# Patient Record
Sex: MALE | Race: WHITE | NOT HISPANIC OR LATINO | Employment: OTHER | URBAN - METROPOLITAN AREA
[De-identification: names, ages, dates, MRNs, and addresses within clinical notes are randomized per-mention and may not be internally consistent; named-entity substitution may affect disease eponyms.]

---

## 2022-08-18 ENCOUNTER — TRANSITIONAL CARE UNIT VISIT (OUTPATIENT)
Dept: GERIATRICS | Facility: CLINIC | Age: 82
End: 2022-08-18
Payer: COMMERCIAL

## 2022-08-18 VITALS
OXYGEN SATURATION: 97 % | DIASTOLIC BLOOD PRESSURE: 71 MMHG | SYSTOLIC BLOOD PRESSURE: 100 MMHG | HEART RATE: 99 BPM | TEMPERATURE: 97.7 F | RESPIRATION RATE: 16 BRPM

## 2022-08-18 DIAGNOSIS — I50.23 ACUTE ON CHRONIC SYSTOLIC HEART FAILURE (H): ICD-10-CM

## 2022-08-18 DIAGNOSIS — I48.91 ATRIAL FIBRILLATION, UNSPECIFIED TYPE (H): ICD-10-CM

## 2022-08-18 DIAGNOSIS — Z95.5 STATUS POST CORONARY ARTERY STENT PLACEMENT: ICD-10-CM

## 2022-08-18 DIAGNOSIS — N18.30 STAGE 3 CHRONIC KIDNEY DISEASE, UNSPECIFIED WHETHER STAGE 3A OR 3B CKD (H): ICD-10-CM

## 2022-08-18 DIAGNOSIS — F41.1 GAD (GENERALIZED ANXIETY DISORDER): ICD-10-CM

## 2022-08-18 DIAGNOSIS — I21.02: Primary | ICD-10-CM

## 2022-08-18 PROCEDURE — 99310 SBSQ NF CARE HIGH MDM 45: CPT | Performed by: NURSE PRACTITIONER

## 2022-08-18 RX ORDER — ASPIRIN 81 MG/1
81 TABLET, CHEWABLE ORAL DAILY
COMMUNITY

## 2022-08-18 RX ORDER — ACETAMINOPHEN 325 MG/1
650 TABLET ORAL EVERY 4 HOURS PRN
COMMUNITY

## 2022-08-18 RX ORDER — TRAZODONE HYDROCHLORIDE 50 MG/1
50 TABLET, FILM COATED ORAL AT BEDTIME
COMMUNITY

## 2022-08-18 RX ORDER — METOPROLOL SUCCINATE 25 MG/1
12.5 TABLET, EXTENDED RELEASE ORAL DAILY
COMMUNITY

## 2022-08-18 RX ORDER — ROSUVASTATIN CALCIUM 20 MG/1
20 TABLET, COATED ORAL DAILY
COMMUNITY

## 2022-08-18 RX ORDER — QUETIAPINE FUMARATE 25 MG/1
25 TABLET, FILM COATED ORAL AT BEDTIME
COMMUNITY

## 2022-08-18 NOTE — LETTER
8/18/2022        RE: Dre Franklin  [address] [facility] [id number]        [facility]    Code Status:  FULL CODE   Visit Type:   Chief Complaint   Patient presents with     Hospital F/U     TCU Admission     Facility:  [facility] ([facility]) [[id number]]           Transitional Care Course: Dre Franklin is a 82 year old male who I am seeing today for admit to the TCU.  Patient recently hospitalized on 8/13/2022 at [facility].  Patient admitted with ST elevation myocardial infarction.  Past medical history includes coronary artery disease status post stenting to the right coronary posterior lateral artery in 2012, COLIN, HFrEF, ischemic cardiomyopathy, hyperlipidemia, obesity and tobacco abuse.  Patient admitted with acute chest pain.  He was found to have an anteroseptal STEMI and underwent emergent coronary angiogram which showed an occluded LAD which was stented.  Patient was noted to have a patent right AGAPITO stent from prior PCI in 2012 with mild to moderate disease.  TTE on 8/12 showed an ejection fracture 27% with regional wall motion abnormalities and mild aortic/pulm pulmonic/tricuspid regurgitation and moderate mitral valve regurgitation.  Patient previously on metoprolol however this was reduced during hospitalization to 12.5 mg daily secondary to soft BP.  He continues on aspirin and statin.  Also on Brilinta for 1 year.  He did developed ICU delirium post operatively.  Suspect underlying dementia.  He does have history of COLIN which is chronic and insomnia which is chronic.  He initially was given IV Haldol and then started on Seroquel on 8/12.  Prior to admit patient was on trazodone at at bedtime, lorazepam as needed and sertraline daily.  Patient also with new diagnosis of atrial fib.  Continue on metoprolol.  Patient also started on Xarelto.  Chronic kidney disease stage III.  Creatinine baseline 1.2.  IV fluids limited secondary to  cardiomyopathy.    On today's visit patient sitting up in bedside chair.  2 days prior patient did get out of bed and went down the stairwell independently.  He has been somewhat impulsive.  Cognitive impairment noted on exam.  He is a very poor historian.  He denies any chest pain or palpitations.  He reports he is eating well.  He is having regular bowel movements and emptying his bladder.  BP satisfactory.  He continues on Seroquel.  He is on trazodone at at bedtime for insomnia however I do not see lorazepam on his orders.  He appears to have no lower extremity edema.  Blood pressure on the very soft side.  Currently asymptomatic.    Active Ambulatory Problems     Diagnosis Date Noted     No Active Ambulatory Problems     Resolved Ambulatory Problems     Diagnosis Date Noted     No Resolved Ambulatory Problems     No Additional Past Medical History     No Known Allergies    All Meds and Allergies reviewed in the record at the facility and is the most up-to-date.    Post Discharge Medication Reconciliation Status: discharge medications reconciled, continue medications without change  Current Outpatient Medications   Medication Sig     acetaminophen (TYLENOL) 325 MG tablet Take 650 mg by mouth every 4 hours as needed for mild pain     aspirin (ASA) 81 MG chewable tablet Take 81 mg by mouth daily     metoprolol succinate ER (TOPROL XL) 25 MG 24 hr tablet Take 12.5 mg by mouth daily     QUEtiapine (SEROQUEL) 25 MG tablet Take 25 mg by mouth At Bedtime     rivaroxaban ANTICOAGULANT (XARELTO) 15 MG TABS tablet Take 15 mg by mouth daily (with dinner)     rosuvastatin (CRESTOR) 20 MG tablet Take 20 mg by mouth daily     sertraline (ZOLOFT) 50 MG tablet Take 50 mg by mouth daily     ticagrelor (BRILINTA) 90 MG tablet Take 90 mg by mouth 2 times daily     traZODone (DESYREL) 50 MG tablet Take 50 mg by mouth At Bedtime     No current facility-administered medications for this visit.       REVIEW OF SYSTEMS:   Review of  Systems  Patient with underlying dementia.  He is somewhat of a poor historian.  Most of the information obtained from the records and nursing staff.    PHYSICAL EXAMINATION:  Physical Exam     Vital signs: /71   Pulse 99   Temp 97.7  F (36.5  C)   Resp 16   SpO2 97%   General: Awake, Alert, walking around in his room without device, follows simple commands, conversant  HEENT:Pink conjunctiva, anicteric sclerae, moist oral mucosa  NECK: Supple   CVS:  S1  S2, without murmur or gallop.   LUNG: Clear to auscultation, No wheezes, rales or rhonci.  BACK: No kyphosis of the thoracic spine  ABDOMEN: Soft, nontender to palpation, with positive bowel sounds  EXTREMITIES: Good range of motion on both upper and lower extremities  SKIN: Warm and dry  NEUROLOGIC: Intact  PSYCHIATRIC: Cognitive impairment noted.      Labs:  All labs reviewed in the nursing home record and Epic       Assessment/plan:    ICD-10-CM    1. Acute ST elevation myocardial infarction (STEMI) due to occlusion of septal branch of left anterior descending (LAD) coronary artery (H)  I21.02  Continue metoprolol, aspirin, statin and Brilinta.  Okay for PT OT and speech to eval and treat.   2. Status post coronary artery stent placement  Z95.5  Cardiac diet.  Follow-up CBC and BMP.   3. Acute on chronic systolic heart failure (H)  I50.23  Appears compensated.  Daily weights.   4. COLIN (generalized anxiety disorder)  F41.1  Recently started on Seroquel 50 mg daily.  Continue sertraline.  Also on trazodone at bedtime.  He was previously on lorazepam 0.5 mg daily as needed.  This is not in the orders.  However given his impulsiveness and risk of falls would avoid.   5. Atrial fibrillation, unspecified type (H)  I48.91  Rate controlled with metoprolol.  Dose was reduced to 12.5 mg during hospitalization secondary to soft BP.  BPs continue to be soft.  Currently asymptomatic.  Monitor BP and pulse every shift.   6. Stage 3 chronic kidney disease,  unspecified whether stage 3a or 3b CKD (H)  N18.30  Follow-up BMP.  Baseline creatinine 1.2.     35 minutes spent of which greater than 50% was spent extensively reviewing the records, reviewing behaviors and treatment as well as collaborating with the nursing staff and therapy.    This note has been dictated using voice recognition software. Any grammatical or context distortions are unintentional and inherent to the software    Electronically signed by: Candi De Oliveira CNP           Sincerely,        Candi De Oliveira NP

## 2022-08-19 ENCOUNTER — LAB REQUISITION (OUTPATIENT)
Dept: LAB | Facility: CLINIC | Age: 82
End: 2022-08-19

## 2022-08-19 DIAGNOSIS — N18.30 CHRONIC KIDNEY DISEASE, STAGE 3 UNSPECIFIED (H): ICD-10-CM

## 2022-08-19 DIAGNOSIS — I21.3 ST ELEVATION (STEMI) MYOCARDIAL INFARCTION OF UNSPECIFIED SITE (H): ICD-10-CM

## 2022-08-19 DIAGNOSIS — Z48.812 ENCOUNTER FOR SURGICAL AFTERCARE FOLLOWING SURGERY ON THE CIRCULATORY SYSTEM: ICD-10-CM

## 2022-08-19 DIAGNOSIS — Z95.5 PRESENCE OF CORONARY ANGIOPLASTY IMPLANT AND GRAFT: ICD-10-CM

## 2022-08-19 LAB
ANION GAP SERPL CALCULATED.3IONS-SCNC: 12 MMOL/L (ref 7–15)
BUN SERPL-MCNC: 21.3 MG/DL (ref 8–23)
CALCIUM SERPL-MCNC: 9.4 MG/DL (ref 8.8–10.2)
CHLORIDE SERPL-SCNC: 103 MMOL/L (ref 98–107)
CREAT SERPL-MCNC: 1.48 MG/DL (ref 0.67–1.17)
DEPRECATED HCO3 PLAS-SCNC: 21 MMOL/L (ref 22–29)
ERYTHROCYTE [DISTWIDTH] IN BLOOD BY AUTOMATED COUNT: 12.8 % (ref 10–15)
GFR SERPL CREATININE-BSD FRML MDRD: 48 ML/MIN/1.73M2
GLUCOSE SERPL-MCNC: 92 MG/DL (ref 70–99)
HCT VFR BLD AUTO: 46.2 % (ref 40–53)
HGB BLD-MCNC: 15.2 G/DL (ref 13.3–17.7)
MCH RBC QN AUTO: 29.3 PG (ref 26.5–33)
MCHC RBC AUTO-ENTMCNC: 32.9 G/DL (ref 31.5–36.5)
MCV RBC AUTO: 89 FL (ref 78–100)
PLATELET # BLD AUTO: 239 10E3/UL (ref 150–450)
POTASSIUM SERPL-SCNC: 4.7 MMOL/L (ref 3.4–5.3)
RBC # BLD AUTO: 5.18 10E6/UL (ref 4.4–5.9)
SODIUM SERPL-SCNC: 136 MMOL/L (ref 136–145)
WBC # BLD AUTO: 8.5 10E3/UL (ref 4–11)

## 2022-08-19 PROCEDURE — 80048 BASIC METABOLIC PNL TOTAL CA: CPT | Performed by: NURSE PRACTITIONER

## 2022-08-19 PROCEDURE — 36415 COLL VENOUS BLD VENIPUNCTURE: CPT | Performed by: NURSE PRACTITIONER

## 2022-08-19 PROCEDURE — P9604 ONE-WAY ALLOW PRORATED TRIP: HCPCS | Performed by: NURSE PRACTITIONER

## 2022-08-19 PROCEDURE — 85027 COMPLETE CBC AUTOMATED: CPT | Performed by: NURSE PRACTITIONER

## 2022-08-19 NOTE — PROGRESS NOTES
[facility] GERIATRIC SERVICES    Code Status:  FULL CODE   Visit Type:   Chief Complaint   Patient presents with     Hospital F/U     TCU Admission     Facility:  [facility] ([facility]) [[id number]]           Transitional Care Course: Dre Franklin is a 82 year old male who I am seeing today for admit to the TCU.  Patient recently hospitalized on 8/13/2022 at [facility].  Patient admitted with ST elevation myocardial infarction.  Past medical history includes coronary artery disease status post stenting to the right coronary posterior lateral artery in 2012, COLIN, HFrEF, ischemic cardiomyopathy, hyperlipidemia, obesity and tobacco abuse.  Patient admitted with acute chest pain.  He was found to have an anteroseptal STEMI and underwent emergent coronary angiogram which showed an occluded LAD which was stented.  Patient was noted to have a patent right AGAPITO stent from prior PCI in 2012 with mild to moderate disease.  TTE on 8/12 showed an ejection fracture 27% with regional wall motion abnormalities and mild aortic/pulm pulmonic/tricuspid regurgitation and moderate mitral valve regurgitation.  Patient previously on metoprolol however this was reduced during hospitalization to 12.5 mg daily secondary to soft BP.  He continues on aspirin and statin.  Also on Brilinta for 1 year.  He did developed ICU delirium post operatively.  Suspect underlying dementia.  He does have history of COLIN which is chronic and insomnia which is chronic.  He initially was given IV Haldol and then started on Seroquel on 8/12.  Prior to admit patient was on trazodone at at bedtime, lorazepam as needed and sertraline daily.  Patient also with new diagnosis of atrial fib.  Continue on metoprolol.  Patient also started on Xarelto.  Chronic kidney disease stage III.  Creatinine baseline 1.2.  IV fluids limited secondary to cardiomyopathy.    On today's visit patient sitting up in bedside chair.  2 days prior patient did get out of  bed and went down the stairwell independently.  He has been somewhat impulsive.  Cognitive impairment noted on exam.  He is a very poor historian.  He denies any chest pain or palpitations.  He reports he is eating well.  He is having regular bowel movements and emptying his bladder.  BP satisfactory.  He continues on Seroquel.  He is on trazodone at at bedtime for insomnia however I do not see lorazepam on his orders.  He appears to have no lower extremity edema.  Blood pressure on the very soft side.  Currently asymptomatic.    Active Ambulatory Problems     Diagnosis Date Noted     No Active Ambulatory Problems     Resolved Ambulatory Problems     Diagnosis Date Noted     No Resolved Ambulatory Problems     No Additional Past Medical History     No Known Allergies    All Meds and Allergies reviewed in the record at the facility and is the most up-to-date.    Post Discharge Medication Reconciliation Status: discharge medications reconciled, continue medications without change  Current Outpatient Medications   Medication Sig     acetaminophen (TYLENOL) 325 MG tablet Take 650 mg by mouth every 4 hours as needed for mild pain     aspirin (ASA) 81 MG chewable tablet Take 81 mg by mouth daily     metoprolol succinate ER (TOPROL XL) 25 MG 24 hr tablet Take 12.5 mg by mouth daily     QUEtiapine (SEROQUEL) 25 MG tablet Take 25 mg by mouth At Bedtime     rivaroxaban ANTICOAGULANT (XARELTO) 15 MG TABS tablet Take 15 mg by mouth daily (with dinner)     rosuvastatin (CRESTOR) 20 MG tablet Take 20 mg by mouth daily     sertraline (ZOLOFT) 50 MG tablet Take 50 mg by mouth daily     ticagrelor (BRILINTA) 90 MG tablet Take 90 mg by mouth 2 times daily     traZODone (DESYREL) 50 MG tablet Take 50 mg by mouth At Bedtime     No current facility-administered medications for this visit.       REVIEW OF SYSTEMS:   Review of Systems  Patient with underlying dementia.  He is somewhat of a poor historian.  Most of the information obtained  from the records and nursing staff.    PHYSICAL EXAMINATION:  Physical Exam     Vital signs: /71   Pulse 99   Temp 97.7  F (36.5  C)   Resp 16   SpO2 97%   General: Awake, Alert, walking around in his room without device, follows simple commands, conversant  HEENT:Pink conjunctiva, anicteric sclerae, moist oral mucosa  NECK: Supple   CVS:  S1  S2, without murmur or gallop.   LUNG: Clear to auscultation, No wheezes, rales or rhonci.  BACK: No kyphosis of the thoracic spine  ABDOMEN: Soft, nontender to palpation, with positive bowel sounds  EXTREMITIES: Good range of motion on both upper and lower extremities  SKIN: Warm and dry  NEUROLOGIC: Intact  PSYCHIATRIC: Cognitive impairment noted.      Labs:  All labs reviewed in the nursing home record and Epic       Assessment/plan:    ICD-10-CM    1. Acute ST elevation myocardial infarction (STEMI) due to occlusion of septal branch of left anterior descending (LAD) coronary artery (H)  I21.02  Continue metoprolol, aspirin, statin and Brilinta.  Okay for PT OT and speech to eval and treat.   2. Status post coronary artery stent placement  Z95.5  Cardiac diet.  Follow-up CBC and BMP.   3. Acute on chronic systolic heart failure (H)  I50.23  Appears compensated.  Daily weights.   4. COLIN (generalized anxiety disorder)  F41.1  Recently started on Seroquel 50 mg daily.  Continue sertraline.  Also on trazodone at bedtime.  He was previously on lorazepam 0.5 mg daily as needed.  This is not in the orders.  However given his impulsiveness and risk of falls would avoid.   5. Atrial fibrillation, unspecified type (H)  I48.91  Rate controlled with metoprolol.  Dose was reduced to 12.5 mg during hospitalization secondary to soft BP.  BPs continue to be soft.  Currently asymptomatic.  Monitor BP and pulse every shift.   6. Stage 3 chronic kidney disease, unspecified whether stage 3a or 3b CKD (H)  N18.30  Follow-up BMP.  Baseline creatinine 1.2.     35 minutes spent of which  greater than 50% was spent extensively reviewing the records, reviewing behaviors and treatment as well as collaborating with the nursing staff and therapy.    This note has been dictated using voice recognition software. Any grammatical or context distortions are unintentional and inherent to the software    Electronically signed by: Candi De Oliveira CNP

## 2022-08-23 ENCOUNTER — TRANSITIONAL CARE UNIT VISIT (OUTPATIENT)
Dept: GERIATRICS | Facility: CLINIC | Age: 82
End: 2022-08-23
Payer: COMMERCIAL

## 2022-08-23 VITALS
DIASTOLIC BLOOD PRESSURE: 84 MMHG | TEMPERATURE: 97.7 F | RESPIRATION RATE: 20 BRPM | BODY MASS INDEX: 30.53 KG/M2 | HEIGHT: 72 IN | SYSTOLIC BLOOD PRESSURE: 123 MMHG | HEART RATE: 82 BPM | WEIGHT: 225.4 LBS | OXYGEN SATURATION: 97 %

## 2022-08-23 DIAGNOSIS — R41.89 COGNITIVE IMPAIRMENT: ICD-10-CM

## 2022-08-23 DIAGNOSIS — F41.1 GAD (GENERALIZED ANXIETY DISORDER): ICD-10-CM

## 2022-08-23 DIAGNOSIS — I21.02: Primary | ICD-10-CM

## 2022-08-23 DIAGNOSIS — N18.30 STAGE 3 CHRONIC KIDNEY DISEASE, UNSPECIFIED WHETHER STAGE 3A OR 3B CKD (H): ICD-10-CM

## 2022-08-23 DIAGNOSIS — R41.0 DELIRIUM: ICD-10-CM

## 2022-08-23 DIAGNOSIS — Z95.5 STATUS POST CORONARY ARTERY STENT PLACEMENT: ICD-10-CM

## 2022-08-23 PROCEDURE — 99309 SBSQ NF CARE MODERATE MDM 30: CPT | Performed by: NURSE PRACTITIONER

## 2022-08-23 NOTE — PROGRESS NOTES
[facility] GERIATRICS    Chief Complaint   Patient presents with     RECHECK     HPI: Dre Franklin is a 82 year old (1940), who is being seen today for an episodic care visit at: [facility] ([facility]) [[id number]].     Transitional Care Course: Dre Franklin is a 82 year old male recently hospitalized on 8/13/2022 at [facility].  Patient admitted with ST elevation myocardial infarction.  Past medical history includes coronary artery disease status post stenting to the right coronary posterior lateral artery in 2012, COLIN, HFrEF, ischemic cardiomyopathy, hyperlipidemia, obesity and tobacco abuse.  Patient admitted with acute chest pain.  He was found to have an anteroseptal STEMI and underwent emergent coronary angiogram which showed an occluded LAD which was stented.  Patient was noted to have a patent right AGAPITO stent from prior PCI in 2012 with mild to moderate disease.  TTE on 8/12 showed an ejection fracture 27% with regional wall motion abnormalities and mild aortic/pulm pulmonic/tricuspid regurgitation and moderate mitral valve regurgitation.  Patient previously on metoprolol however this was reduced during hospitalization to 12.5 mg daily secondary to soft BP.  He continues on aspirin and statin.  Also on Brilinta for 1 year.  He did developed ICU delirium post operatively.  Suspect underlying dementia.  He does have history of COLIN which is chronic and insomnia which is chronic.  He initially was given IV Haldol and then started on Seroquel on 8/12.  Prior to admit patient was on trazodone at at bedtime, lorazepam as needed and sertraline daily.  Patient also with new diagnosis of atrial fib.  Continue on metoprolol.  Patient also started on Xarelto.  Chronic kidney disease stage III.  Creatinine baseline 1.2.  IV fluids limited secondary to cardiomyopathy.    Chief Concerns Today:  Dre reports no new concerns today and feels he is getting stronger.  He reports he is  sleeping well, and without pain, no chest pain, no dyspnea, no cough, no congestion.  He reports he is eating well and generally feeling well.     Allergies, as well as Past Medical, Surgical, and Family History reviewed in Epic.    Medication list reviewed and reconciled.  Current Outpatient Medications:      acetaminophen (TYLENOL) 325 MG tablet, Take 650 mg by mouth every 4 hours as needed for mild pain, Disp: , Rfl:      aspirin (ASA) 81 MG chewable tablet, Take 81 mg by mouth daily, Disp: , Rfl:      metoprolol succinate ER (TOPROL XL) 25 MG 24 hr tablet, Take 12.5 mg by mouth daily, Disp: , Rfl:      QUEtiapine (SEROQUEL) 25 MG tablet, Take 25 mg by mouth At Bedtime, Disp: , Rfl:      rivaroxaban ANTICOAGULANT (XARELTO) 15 MG TABS tablet, Take 15 mg by mouth daily (with dinner), Disp: , Rfl:      rosuvastatin (CRESTOR) 20 MG tablet, Take 20 mg by mouth daily, Disp: , Rfl:      sertraline (ZOLOFT) 50 MG tablet, Take 50 mg by mouth daily, Disp: , Rfl:      ticagrelor (BRILINTA) 90 MG tablet, Take 90 mg by mouth 2 times daily, Disp: , Rfl:      traZODone (DESYREL) 50 MG tablet, Take 50 mg by mouth At Bedtime, Disp: , Rfl:     ROS:  4 point ROS including Respiratory, CV, GI and , other than that noted in the HPI,  is negative    Vitals:  /84   Pulse 82   Temp 97.7  F (36.5  C)   Resp 20   Ht 1.829 m (6')   Wt 102.2 kg (225 lb 6.4 oz)   SpO2 97%   BMI 30.57 kg/m    Exam:  GENERAL APPEARANCE:  Alert, in no distress   HEAD:  Normal, normocephalic, atraumatic  EYE EXAM: normal external eye, conjunctiva, lids, MELVA  CHEST/RESP:  respiratory effort  normal, no respiratory distress, lung sounds CTA    CV:  Rate reg, rhythm reg, no murmur, no peripheral edema   M/S:   extremities normal, gait normal-using rolling walker for mobility , muscle tone normal , digits and nails normal   SKIN EXAM: R Groin angiogram site with bruising but without open areas, no drainage   NEUROLOGIC EXAM: Cranial nerves 2-12 are  grossly normal.  No tremor, gross motor movement at baseline.   PSYCH:  at baseline mentation, alert and oriented to self and family, mood appropriate   BIMS 15/15  PHQ9 8/27     Most Recent 3 CBC's:  Recent Labs   Lab Test 08/19/22  0815   WBC 8.5   HGB 15.2   MCV 89        Most Recent 3 BMP's:  Recent Labs   Lab Test 08/19/22  0815      POTASSIUM 4.7   CHLORIDE 103   CO2 21*   BUN 21.3   CR 1.48*   ANIONGAP 12   NUBIA 9.4   GLC 92      Assessment/Plan:  Acute ST elevation myocardial infarction (STEMI) due to occlusion of septal branch of left anterior descending (LAD) coronary artery (H)  Status post coronary artery stent placement  Patient with new STEMI S/P stent placement.  No new symptoms noted, he is making progress in therapy.  On aspirin, brilinta, statin, metoprolol.  BP low normal but without chest pain, no dyspnea.   -continue current plan     Stage 3 chronic kidney disease, unspecified whether stage 3a or 3b CKD (H)  Baseline creat 1.3 - 1.5.  Most recent creatinine 1.48, eGFR 48. stable  -Avoid nephrotoxic medications  -Renal dosing of medications     COLIN (generalized anxiety disorder)  Delirium  Cognitive impairment  Patient with history of COLIN, previously on lorazepam.  Also noted to develop delirium in the hospital and there are concerns for underlying dementia.  BIMS 15/15.  There were also some concerns for wandering and elopement but there have been no elopement attempts over last several days.  He remains on quetiapine at hs as well as sertraline.   -continue current plan   -consider decrease of quetiapine in the future     Orders:    No new orders today, stable and making progress in therapy      Follow up with in 1 week     Electronically signed by: MORENO Ramos CNP

## 2022-08-23 NOTE — LETTER
8/23/2022        RE: Dre Franklin  [address]  [place] [facility] [address]        [facility] GERIATRICS    Chief Complaint   Patient presents with     RECHECK     HPI: Dre Franklin is a 82 year old (1940), who is being seen today for an episodic care visit at: [facility]) [[id number]].     Transitional Care Course: Dre Franklin is a 82 year old male recently hospitalized on 8/13/2022 at [facility].  Patient admitted with ST elevation myocardial infarction.  Past medical history includes coronary artery disease status post stenting to the right coronary posterior lateral artery in 2012, COLIN, HFrEF, ischemic cardiomyopathy, hyperlipidemia, obesity and tobacco abuse.  Patient admitted with acute chest pain.  He was found to have an anteroseptal STEMI and underwent emergent coronary angiogram which showed an occluded LAD which was stented.  Patient was noted to have a patent right AGAPITO stent from prior PCI in 2012 with mild to moderate disease.  TTE on 8/12 showed an ejection fracture 27% with regional wall motion abnormalities and mild aortic/pulm pulmonic/tricuspid regurgitation and moderate mitral valve regurgitation.  Patient previously on metoprolol however this was reduced during hospitalization to 12.5 mg daily secondary to soft BP.  He continues on aspirin and statin.  Also on Brilinta for 1 year.  He did developed ICU delirium post operatively.  Suspect underlying dementia.  He does have history of COLIN which is chronic and insomnia which is chronic.  He initially was given IV Haldol and then started on Seroquel on 8/12.  Prior to admit patient was on trazodone at at bedtime, lorazepam as needed and sertraline daily.  Patient also with new diagnosis of atrial fib.  Continue on metoprolol.  Patient also started on Xarelto.  Chronic kidney disease stage III.  Creatinine baseline 1.2.  IV fluids limited secondary to cardiomyopathy.    Chief Concerns  Today:  Dre reports no new concerns today and feels he is getting stronger.  He reports he is sleeping well, and without pain, no chest pain, no dyspnea, no cough, no congestion.  He reports he is eating well and generally feeling well.     Allergies, as well as Past Medical, Surgical, and Family History reviewed in Epic.    Medication list reviewed and reconciled.  Current Outpatient Medications:      acetaminophen (TYLENOL) 325 MG tablet, Take 650 mg by mouth every 4 hours as needed for mild pain, Disp: , Rfl:      aspirin (ASA) 81 MG chewable tablet, Take 81 mg by mouth daily, Disp: , Rfl:      metoprolol succinate ER (TOPROL XL) 25 MG 24 hr tablet, Take 12.5 mg by mouth daily, Disp: , Rfl:      QUEtiapine (SEROQUEL) 25 MG tablet, Take 25 mg by mouth At Bedtime, Disp: , Rfl:      rivaroxaban ANTICOAGULANT (XARELTO) 15 MG TABS tablet, Take 15 mg by mouth daily (with dinner), Disp: , Rfl:      rosuvastatin (CRESTOR) 20 MG tablet, Take 20 mg by mouth daily, Disp: , Rfl:      sertraline (ZOLOFT) 50 MG tablet, Take 50 mg by mouth daily, Disp: , Rfl:      ticagrelor (BRILINTA) 90 MG tablet, Take 90 mg by mouth 2 times daily, Disp: , Rfl:      traZODone (DESYREL) 50 MG tablet, Take 50 mg by mouth At Bedtime, Disp: , Rfl:     ROS:  4 point ROS including Respiratory, CV, GI and , other than that noted in the HPI,  is negative    Vitals:  /84   Pulse 82   Temp 97.7  F (36.5  C)   Resp 20   Ht 1.829 m (6')   Wt 102.2 kg (225 lb 6.4 oz)   SpO2 97%   BMI 30.57 kg/m    Exam:  GENERAL APPEARANCE:  Alert, in no distress   HEAD:  Normal, normocephalic, atraumatic  EYE EXAM: normal external eye, conjunctiva, lids, MELVA  CHEST/RESP:  respiratory effort  normal, no respiratory distress, lung sounds CTA    CV:  Rate reg, rhythm reg, no murmur, no peripheral edema   M/S:   extremities normal, gait normal-using rolling walker for mobility , muscle tone normal , digits and nails normal   SKIN EXAM: R Groin angiogram  site with bruising but without open areas, no drainage   NEUROLOGIC EXAM: Cranial nerves 2-12 are grossly normal.  No tremor, gross motor movement at baseline.   PSYCH:  at baseline mentation, alert and oriented to self and family, mood appropriate   BIMS 15/15  PHQ9 8/27     Most Recent 3 CBC's:  Recent Labs   Lab Test 08/19/22  0815   WBC 8.5   HGB 15.2   MCV 89        Most Recent 3 BMP's:  Recent Labs   Lab Test 08/19/22  0815      POTASSIUM 4.7   CHLORIDE 103   CO2 21*   BUN 21.3   CR 1.48*   ANIONGAP 12   NUBIA 9.4   GLC 92      Assessment/Plan:  Acute ST elevation myocardial infarction (STEMI) due to occlusion of septal branch of left anterior descending (LAD) coronary artery (H)  Status post coronary artery stent placement  Patient with new STEMI S/P stent placement.  No new symptoms noted, he is making progress in therapy.  On aspirin, brilinta, statin, metoprolol.  BP low normal but without chest pain, no dyspnea.   -continue current plan     Stage 3 chronic kidney disease, unspecified whether stage 3a or 3b CKD (H)  Baseline creat 1.3 - 1.5.  Most recent creatinine 1.48, eGFR 48. stable  -Avoid nephrotoxic medications  -Renal dosing of medications     COLIN (generalized anxiety disorder)  Delirium  Cognitive impairment  Patient with history of COLIN, previously on lorazepam.  Also noted to develop delirium in the hospital and there are concerns for underlying dementia.  BIMS 15/15.  There were also some concerns for wandering and elopement but there have been no elopement attempts over last several days.  He remains on quetiapine at hs as well as sertraline.   -continue current plan   -consider decrease of quetiapine in the future     Orders:    No new orders today, stable and making progress in therapy      Follow up with in 1 week     Electronically signed by: MORENO Ramos CNP         Sincerely,        MORENO Ramos CNP

## 2022-08-29 ENCOUNTER — TRANSITIONAL CARE UNIT VISIT (OUTPATIENT)
Dept: GERIATRICS | Facility: CLINIC | Age: 82
End: 2022-08-29
Payer: COMMERCIAL

## 2022-08-29 VITALS
RESPIRATION RATE: 16 BRPM | HEART RATE: 105 BPM | TEMPERATURE: 97.8 F | DIASTOLIC BLOOD PRESSURE: 76 MMHG | OXYGEN SATURATION: 98 % | WEIGHT: 224.8 LBS | BODY MASS INDEX: 30.45 KG/M2 | SYSTOLIC BLOOD PRESSURE: 111 MMHG | HEIGHT: 72 IN

## 2022-08-29 DIAGNOSIS — I21.02: Primary | ICD-10-CM

## 2022-08-29 DIAGNOSIS — I50.23 ACUTE ON CHRONIC SYSTOLIC HEART FAILURE (H): ICD-10-CM

## 2022-08-29 DIAGNOSIS — I48.91 ATRIAL FIBRILLATION, UNSPECIFIED TYPE (H): ICD-10-CM

## 2022-08-29 DIAGNOSIS — F41.1 GAD (GENERALIZED ANXIETY DISORDER): ICD-10-CM

## 2022-08-29 DIAGNOSIS — N18.30 STAGE 3 CHRONIC KIDNEY DISEASE, UNSPECIFIED WHETHER STAGE 3A OR 3B CKD (H): ICD-10-CM

## 2022-08-29 DIAGNOSIS — R41.89 COGNITIVE IMPAIRMENT: ICD-10-CM

## 2022-08-29 DIAGNOSIS — Z95.5 STATUS POST CORONARY ARTERY STENT PLACEMENT: ICD-10-CM

## 2022-08-29 PROCEDURE — 99316 NF DSCHRG MGMT 30 MIN+: CPT | Performed by: NURSE PRACTITIONER

## 2022-08-29 NOTE — PROGRESS NOTES
[facility] GERIATRIC SERVICES    Code Status:  FULL CODE   Visit Type:   Chief Complaint   Patient presents with     Discharge Summary Nursing Home     Facility:  [facility] ([facility]) [[id number]]           Transitional Care Course: Dre Franklin is a 82 year old male who I am seeing today for discharge from the TCU.  Patient recently hospitalized on 8/13/2022 at [facility].  Patient admitted with ST elevation myocardial infarction.  Past medical history includes coronary artery disease status post stenting to the right coronary posterior lateral artery in 2012, COLIN, HFrEF, ischemic cardiomyopathy, hyperlipidemia, obesity and tobacco abuse.  Patient admitted with acute chest pain.  He was found to have an anteroseptal STEMI and underwent emergent coronary angiogram which showed an occluded LAD which was stented.  Patient was noted to have a patent right AGAPITO stent from prior PCI in 2012 with mild to moderate disease.  TTE on 8/12 showed an ejection fracture 27% with regional wall motion abnormalities and mild aortic/pulm pulmonic/tricuspid regurgitation and moderate mitral valve regurgitation.  Patient previously on metoprolol however this was reduced during hospitalization to 12.5 mg daily secondary to soft BP.  He continues on aspirin and statin.  Also on Brilinta for 1 year.  He did developed ICU delirium post operatively.  Suspect underlying dementia.  He does have history of COLIN which is chronic and insomnia which is chronic.  He initially was given IV Haldol and then started on Seroquel on 8/12.  Prior to admit patient was on trazodone at at bedtime, lorazepam as needed and sertraline daily.  Patient also with new diagnosis of atrial fib.  Continue on metoprolol.  Patient also started on Xarelto.  Chronic kidney disease stage III.  Creatinine baseline 1.2.  IV fluids limited secondary to cardiomyopathy.    On today's visit patient sitting up in chair. His daughter is present on exam.  Patient  status post ST elevation MI with stent placement.  He continues on statin, Xarelto and Brilinta.  He denies any chest pain or palpitations.  Underlying atrial fibs.  Rate controlled with metoprolol.  Recently started on Xarelto.  CHF.  Compensated.  Chronic kidney disease.  Creatinine stable.  Patient did have some soft BPs during his TCU stay initially however this improved.  Delirium during hospitalization.  During his TCU stay patient also attempted elopement.  Patient continues on Seroquel.  He is on trazodone for insomnia.  He was on lorazepam previously at home however this has been discontinued secondary for risk of falls.  He does have history of COLIN.      Active Ambulatory Problems     Diagnosis Date Noted     Stented coronary artery 06/14/2012     Social anxiety disorder 04/30/2015     Panic disorder with agoraphobia 07/19/2012     Old myocardial infarction 07/19/2012     Obesity 06/14/2012     Insomnia 04/30/2015     Hyperlipidemia 06/14/2012     Hematuria 07/14/2012     Generalized anxiety disorder 07/19/2012     Atherosclerosis of coronary artery 06/14/2012     Acute inferior myocardial infarction (H) 06/14/2012     Resolved Ambulatory Problems     Diagnosis Date Noted     No Resolved Ambulatory Problems     No Additional Past Medical History     No Known Allergies    All Meds and Allergies reviewed in the record at the facility and is the most up-to-date.    Post Discharge Medication Reconciliation Status: discharge medications reconciled, continue medications without change  Current Outpatient Medications   Medication Sig     acetaminophen (TYLENOL) 325 MG tablet Take 650 mg by mouth every 4 hours as needed for mild pain     aspirin (ASA) 81 MG chewable tablet Take 81 mg by mouth daily     metoprolol succinate ER (TOPROL XL) 25 MG 24 hr tablet Take 12.5 mg by mouth daily     QUEtiapine (SEROQUEL) 25 MG tablet Take 25 mg by mouth At Bedtime     rivaroxaban ANTICOAGULANT (XARELTO) 15 MG TABS tablet Take  15 mg by mouth daily (with dinner)     rosuvastatin (CRESTOR) 20 MG tablet Take 20 mg by mouth daily     sertraline (ZOLOFT) 50 MG tablet Take 50 mg by mouth daily     ticagrelor (BRILINTA) 90 MG tablet Take 90 mg by mouth 2 times daily     traZODone (DESYREL) 50 MG tablet Take 50 mg by mouth At Bedtime     No current facility-administered medications for this visit.       REVIEW OF SYSTEMS:   Review of Systems  Patient with underlying dementia.  He is somewhat of a poor historian.     PHYSICAL EXAMINATION:  Physical Exam     Vital signs: /76   Pulse 105   Temp 97.8  F (36.6  C)   Resp 16   Ht 1.829 m (6')   Wt 102 kg (224 lb 12.8 oz)   SpO2 98%   BMI 30.49 kg/m    General: Awake, Alert,  conversant  HEENT:Pink conjunctiva, anicteric sclerae, moist oral mucosa  NECK: Supple   CVS:  S1  S2, without murmur or gallop.   LUNG: Clear to auscultation, No wheezes, rales or rhonci.  BACK: No kyphosis of the thoracic spine  ABDOMEN: Soft,  with positive bowel sounds  EXTREMITIES: Good range of motion on both upper and lower extremities.  Ambulatory with rolling walker.  NEUROLOGIC: Intact  PSYCHIATRIC: Cognitive impairment noted.      Labs:  All labs reviewed in the nursing home record and Epic       Assessment/plan:      ICD-10-CM    1. Acute ST elevation myocardial infarction (STEMI) due to occlusion of septal branch of left anterior descending (LAD) coronary artery (H)  I21.02  Continue aspirin, recently started on Xarelto.  Also on Brilinta.   2. Status post coronary artery stent placement  Z95.5    3. Atrial fibrillation, unspecified type (H)  I48.91  Rate controlled with metoprolol.  Also recently started on Xarelto.   4. Stage 3 chronic kidney disease, unspecified whether stage 3a or 3b CKD (H)  N18.30  Creatinine baseline 1.2.    5. COLIN (generalized anxiety disorder)  F41.1  Continues on Seroquel.  Would advise not to use lorazepam secondary to the risk of falls.   Also continues on trazodone at at  bedtime.   6. Cognitive impairment  R41.89  Recent delirium during hospitalization.   7. Acute on chronic systolic heart failure (H)  I50.23  Compensated.     Okay to DC home with current meds and treatments.  Home PT, OT, home health aide and RN for medication management.  Follow-up with primary care provider in 1 week.  Follow-up outpatient with cardiology.    DISCHARGE PLAN/FACE TO FACE:  I certify that this patient is under my care and that I, or a nurse practitioner or physician's assistant working with me, had a face-to-face encounter that meets the physician face-to-face encounter requirements with this patient.       I certify that, based on my findings, the following services are medically necessary home health services.    My clinical findings support the need for the above skilled services.    This patient is homebound because: Recent hospitalization for NSTEMI with stenting.    The patient is, or has been, under my care and I have initiated the establishment of the plan of care. This patient will be followed by a physician who will periodically review the plan of care.      35 minutes spent of which greater than 50% was spent reviewing discharge medications, home care services and follow-ups as well as collaborating with nursing staff and .    This note has been dictated using voice recognition software. Any grammatical or context distortions are unintentional and inherent to the software    Electronically signed by: aCndi De Oliveira CNP

## 2022-08-29 NOTE — LETTER
8/29/2022        RE: Dre Franklin  [address] [address]         HEALTH GERIATRIC SERVICES    Code Status:  FULL CODE   Visit Type:   Chief Complaint   Patient presents with     Discharge Summary Nursing Home     Facility:  [facility] [place] ([facility]) [[id number]]           Transitional Care Course: Dre Franklin is a 82 year old male who I am seeing today for discharge from the TCU.  Patient recently hospitalized on 8/13/2022 at [facility].  Patient admitted with ST elevation myocardial infarction.  Past medical history includes coronary artery disease status post stenting to the right coronary posterior lateral artery in 2012, COLIN, HFrEF, ischemic cardiomyopathy, hyperlipidemia, obesity and tobacco abuse.  Patient admitted with acute chest pain.  He was found to have an anteroseptal STEMI and underwent emergent coronary angiogram which showed an occluded LAD which was stented.  Patient was noted to have a patent right AGAPITO stent from prior PCI in 2012 with mild to moderate disease.  TTE on 8/12 showed an ejection fracture 27% with regional wall motion abnormalities and mild aortic/pulm pulmonic/tricuspid regurgitation and moderate mitral valve regurgitation.  Patient previously on metoprolol however this was reduced during hospitalization to 12.5 mg daily secondary to soft BP.  He continues on aspirin and statin.  Also on Brilinta for 1 year.  He did developed ICU delirium post operatively.  Suspect underlying dementia.  He does have history of COLIN which is chronic and insomnia which is chronic.  He initially was given IV Haldol and then started on Seroquel on 8/12.  Prior to admit patient was on trazodone at at bedtime, lorazepam as needed and sertraline daily.  Patient also with new diagnosis of atrial fib.  Continue on metoprolol.  Patient also started on Xarelto.  Chronic kidney disease stage III.  Creatinine baseline 1.2.  IV fluids limited secondary to  cardiomyopathy.    On today's visit patient sitting up in chair. His daughter is present on exam.  Patient status post ST elevation MI with stent placement.  He continues on statin, Xarelto and Brilinta.  He denies any chest pain or palpitations.  Underlying atrial fibs.  Rate controlled with metoprolol.  Recently started on Xarelto.  CHF.  Compensated.  Chronic kidney disease.  Creatinine stable.  Patient did have some soft BPs during his TCU stay initially however this improved.  Delirium during hospitalization.  During his TCU stay patient also attempted elopement.  Patient continues on Seroquel.  He is on trazodone for insomnia.  He was on lorazepam previously at home however this has been discontinued secondary for risk of falls.  He does have history of COLIN.      Active Ambulatory Problems     Diagnosis Date Noted     Stented coronary artery 06/14/2012     Social anxiety disorder 04/30/2015     Panic disorder with agoraphobia 07/19/2012     Old myocardial infarction 07/19/2012     Obesity 06/14/2012     Insomnia 04/30/2015     Hyperlipidemia 06/14/2012     Hematuria 07/14/2012     Generalized anxiety disorder 07/19/2012     Atherosclerosis of coronary artery 06/14/2012     Acute inferior myocardial infarction (H) 06/14/2012     Resolved Ambulatory Problems     Diagnosis Date Noted     No Resolved Ambulatory Problems     No Additional Past Medical History     No Known Allergies    All Meds and Allergies reviewed in the record at the facility and is the most up-to-date.    Post Discharge Medication Reconciliation Status: discharge medications reconciled, continue medications without change  Current Outpatient Medications   Medication Sig     acetaminophen (TYLENOL) 325 MG tablet Take 650 mg by mouth every 4 hours as needed for mild pain     aspirin (ASA) 81 MG chewable tablet Take 81 mg by mouth daily     metoprolol succinate ER (TOPROL XL) 25 MG 24 hr tablet Take 12.5 mg by mouth daily     QUEtiapine (SEROQUEL)  25 MG tablet Take 25 mg by mouth At Bedtime     rivaroxaban ANTICOAGULANT (XARELTO) 15 MG TABS tablet Take 15 mg by mouth daily (with dinner)     rosuvastatin (CRESTOR) 20 MG tablet Take 20 mg by mouth daily     sertraline (ZOLOFT) 50 MG tablet Take 50 mg by mouth daily     ticagrelor (BRILINTA) 90 MG tablet Take 90 mg by mouth 2 times daily     traZODone (DESYREL) 50 MG tablet Take 50 mg by mouth At Bedtime     No current facility-administered medications for this visit.       REVIEW OF SYSTEMS:   Review of Systems  Patient with underlying dementia.  He is somewhat of a poor historian.     PHYSICAL EXAMINATION:  Physical Exam     Vital signs: /76   Pulse 105   Temp 97.8  F (36.6  C)   Resp 16   Ht 1.829 m (6')   Wt 102 kg (224 lb 12.8 oz)   SpO2 98%   BMI 30.49 kg/m    General: Awake, Alert,  conversant  HEENT:Pink conjunctiva, anicteric sclerae, moist oral mucosa  NECK: Supple   CVS:  S1  S2, without murmur or gallop.   LUNG: Clear to auscultation, No wheezes, rales or rhonci.  BACK: No kyphosis of the thoracic spine  ABDOMEN: Soft,  with positive bowel sounds  EXTREMITIES: Good range of motion on both upper and lower extremities.  Ambulatory with rolling walker.  NEUROLOGIC: Intact  PSYCHIATRIC: Cognitive impairment noted.      Labs:  All labs reviewed in the nursing home record and Epic       Assessment/plan:      ICD-10-CM    1. Acute ST elevation myocardial infarction (STEMI) due to occlusion of septal branch of left anterior descending (LAD) coronary artery (H)  I21.02  Continue aspirin, recently started on Xarelto.  Also on Brilinta.   2. Status post coronary artery stent placement  Z95.5    3. Atrial fibrillation, unspecified type (H)  I48.91  Rate controlled with metoprolol.  Also recently started on Xarelto.   4. Stage 3 chronic kidney disease, unspecified whether stage 3a or 3b CKD (H)  N18.30  Creatinine baseline 1.2.    5. COLIN (generalized anxiety disorder)  F41.1  Continues on Seroquel.   Would advise not to use lorazepam secondary to the risk of falls.   Also continues on trazodone at at bedtime.   6. Cognitive impairment  R41.89  Recent delirium during hospitalization.   7. Acute on chronic systolic heart failure (H)  I50.23  Compensated.     Okay to DC home with current meds and treatments.  Home PT, OT, home health aide and RN for medication management.  Follow-up with primary care provider in 1 week.  Follow-up outpatient with cardiology.    DISCHARGE PLAN/FACE TO FACE:  I certify that this patient is under my care and that I, or a nurse practitioner or physician's assistant working with me, had a face-to-face encounter that meets the physician face-to-face encounter requirements with this patient.       I certify that, based on my findings, the following services are medically necessary home health services.    My clinical findings support the need for the above skilled services.    This patient is homebound because: Recent hospitalization for NSTEMI with stenting.    The patient is, or has been, under my care and I have initiated the establishment of the plan of care. This patient will be followed by a physician who will periodically review the plan of care.      35 minutes spent of which greater than 50% was spent reviewing discharge medications, home care services and follow-ups as well as collaborating with nursing staff and .    This note has been dictated using voice recognition software. Any grammatical or context distortions are unintentional and inherent to the software    Electronically signed by: Candi De Oliveira CNP           Sincerely,        Candi De Oliveira NP

## 2023-06-01 ENCOUNTER — TELEPHONE (OUTPATIENT)
Dept: VASCULAR SURGERY | Facility: CLINIC | Age: 83
End: 2023-06-01
Payer: MEDICAID

## 2023-06-01 NOTE — TELEPHONE ENCOUNTER
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

## 2023-06-01 NOTE — TELEPHONE ENCOUNTER
Caller: Fani Pacheco    Provider: N/A    Detailed reason for call: Patient's daughter would like to schedule an appointment with a vascular surgeon in [place] for AAA and iliac anuerysm; patient was recently seen/diagnosed at [facility]. Daughter states they were originally referred to a doctor in [place], but patient will be moving to [place] next week. Please review and indicate if OK to schedule/studies needed.     Best phone number to contact: [phone]    Best time to contact: Any    Ok to leave a detailed message: Yes    Ok to speak to authorized person if needed: Yes: Fani      (Noted to patient if reason is related to wound or incision, to please send a photo via email or [employer].)      How Severe Is Your Cyst?: mild Is This A New Presentation, Or A Follow-Up?: Follow Up Cyst

## 2023-06-05 NOTE — TELEPHONE ENCOUNTER
Patient has [employer] insurance unable to schedule any apt.  Daughter is ok with this and will find another provider

## 2024-01-17 ENCOUNTER — LAB REQUISITION (OUTPATIENT)
Dept: LAB | Facility: CLINIC | Age: 84
End: 2024-01-17
Payer: COMMERCIAL

## 2024-01-17 DIAGNOSIS — E78.5 HYPERLIPIDEMIA, UNSPECIFIED: ICD-10-CM

## 2024-01-17 DIAGNOSIS — R41.89 OTHER SYMPTOMS AND SIGNS INVOLVING COGNITIVE FUNCTIONS AND AWARENESS: ICD-10-CM

## 2024-01-17 DIAGNOSIS — I50.9 HEART FAILURE, UNSPECIFIED (H): ICD-10-CM

## 2024-01-23 LAB
ERYTHROCYTE [DISTWIDTH] IN BLOOD BY AUTOMATED COUNT: 13.4 % (ref 10–15)
HCT VFR BLD AUTO: 39.8 % (ref 40–53)
HGB BLD-MCNC: 12.8 G/DL (ref 13.3–17.7)
MCH RBC QN AUTO: 29.2 PG (ref 26.5–33)
MCHC RBC AUTO-ENTMCNC: 32.2 G/DL (ref 31.5–36.5)
MCV RBC AUTO: 91 FL (ref 78–100)
PLATELET # BLD AUTO: 169 10E3/UL (ref 150–450)
RBC # BLD AUTO: 4.39 10E6/UL (ref 4.4–5.9)
WBC # BLD AUTO: 7 10E3/UL (ref 4–11)

## 2024-01-23 PROCEDURE — 80048 BASIC METABOLIC PNL TOTAL CA: CPT | Mod: ORL | Performed by: PHYSICIAN ASSISTANT

## 2024-01-23 PROCEDURE — 85027 COMPLETE CBC AUTOMATED: CPT | Mod: ORL | Performed by: PHYSICIAN ASSISTANT

## 2024-01-23 PROCEDURE — 82607 VITAMIN B-12: CPT | Mod: ORL | Performed by: PHYSICIAN ASSISTANT

## 2024-01-23 PROCEDURE — 80061 LIPID PANEL: CPT | Mod: ORL | Performed by: PHYSICIAN ASSISTANT

## 2024-01-23 PROCEDURE — P9604 ONE-WAY ALLOW PRORATED TRIP: HCPCS | Mod: ORL | Performed by: PHYSICIAN ASSISTANT

## 2024-01-23 PROCEDURE — 84443 ASSAY THYROID STIM HORMONE: CPT | Mod: ORL | Performed by: PHYSICIAN ASSISTANT

## 2024-01-23 PROCEDURE — 36415 COLL VENOUS BLD VENIPUNCTURE: CPT | Mod: ORL | Performed by: PHYSICIAN ASSISTANT

## 2024-01-24 LAB
ANION GAP SERPL CALCULATED.3IONS-SCNC: 7 MMOL/L (ref 7–15)
BUN SERPL-MCNC: 17.1 MG/DL (ref 8–23)
CALCIUM SERPL-MCNC: 9.1 MG/DL (ref 8.8–10.2)
CHLORIDE SERPL-SCNC: 107 MMOL/L (ref 98–107)
CHOLEST SERPL-MCNC: 104 MG/DL
CREAT SERPL-MCNC: 1.39 MG/DL (ref 0.67–1.17)
DEPRECATED HCO3 PLAS-SCNC: 25 MMOL/L (ref 22–29)
EGFRCR SERPLBLD CKD-EPI 2021: 51 ML/MIN/1.73M2
FASTING STATUS PATIENT QL REPORTED: ABNORMAL
GLUCOSE SERPL-MCNC: 72 MG/DL (ref 70–99)
HDLC SERPL-MCNC: 38 MG/DL
LDLC SERPL CALC-MCNC: 54 MG/DL
NONHDLC SERPL-MCNC: 66 MG/DL
POTASSIUM SERPL-SCNC: 4.9 MMOL/L (ref 3.4–5.3)
SODIUM SERPL-SCNC: 139 MMOL/L (ref 135–145)
TRIGL SERPL-MCNC: 58 MG/DL
TSH SERPL DL<=0.005 MIU/L-ACNC: 2.76 UIU/ML (ref 0.3–4.2)
VIT B12 SERPL-MCNC: 293 PG/ML (ref 232–1245)

## 2024-09-05 ENCOUNTER — LAB REQUISITION (OUTPATIENT)
Dept: LAB | Facility: CLINIC | Age: 84
End: 2024-09-05
Payer: COMMERCIAL

## 2024-09-05 DIAGNOSIS — I10 ESSENTIAL (PRIMARY) HYPERTENSION: ICD-10-CM

## 2024-09-10 LAB
ANION GAP SERPL CALCULATED.3IONS-SCNC: 10 MMOL/L (ref 7–15)
BUN SERPL-MCNC: 25.1 MG/DL (ref 8–23)
CALCIUM SERPL-MCNC: 9.2 MG/DL (ref 8.8–10.4)
CHLORIDE SERPL-SCNC: 108 MMOL/L (ref 98–107)
CREAT SERPL-MCNC: 1.73 MG/DL (ref 0.67–1.17)
EGFRCR SERPLBLD CKD-EPI 2021: 39 ML/MIN/1.73M2
ERYTHROCYTE [DISTWIDTH] IN BLOOD BY AUTOMATED COUNT: 13.1 % (ref 10–15)
GLUCOSE SERPL-MCNC: 73 MG/DL (ref 70–99)
HCO3 SERPL-SCNC: 23 MMOL/L (ref 22–29)
HCT VFR BLD AUTO: 48.8 % (ref 40–53)
HGB BLD-MCNC: 15.8 G/DL (ref 13.3–17.7)
MCH RBC QN AUTO: 29.6 PG (ref 26.5–33)
MCHC RBC AUTO-ENTMCNC: 32.4 G/DL (ref 31.5–36.5)
MCV RBC AUTO: 91 FL (ref 78–100)
PLATELET # BLD AUTO: 153 10E3/UL (ref 150–450)
POTASSIUM SERPL-SCNC: 4.1 MMOL/L (ref 3.4–5.3)
RBC # BLD AUTO: 5.34 10E6/UL (ref 4.4–5.9)
SODIUM SERPL-SCNC: 141 MMOL/L (ref 135–145)
WBC # BLD AUTO: 8.2 10E3/UL (ref 4–11)

## 2024-09-10 PROCEDURE — 80048 BASIC METABOLIC PNL TOTAL CA: CPT | Mod: ORL | Performed by: FAMILY MEDICINE

## 2024-09-10 PROCEDURE — P9604 ONE-WAY ALLOW PRORATED TRIP: HCPCS | Mod: ORL | Performed by: FAMILY MEDICINE

## 2024-09-10 PROCEDURE — 85027 COMPLETE CBC AUTOMATED: CPT | Mod: ORL | Performed by: FAMILY MEDICINE

## 2024-09-10 PROCEDURE — 36415 COLL VENOUS BLD VENIPUNCTURE: CPT | Mod: ORL | Performed by: FAMILY MEDICINE

## 2025-06-16 ENCOUNTER — LAB REQUISITION (OUTPATIENT)
Dept: LAB | Facility: CLINIC | Age: 85
End: 2025-06-16
Payer: COMMERCIAL

## 2025-06-16 DIAGNOSIS — G30.1 ALZHEIMER'S DISEASE WITH LATE ONSET (CODE) (H): ICD-10-CM

## 2025-06-16 DIAGNOSIS — I42.9 CARDIOMYOPATHY, UNSPECIFIED (H): ICD-10-CM

## 2025-06-16 DIAGNOSIS — N40.0 BENIGN PROSTATIC HYPERPLASIA WITHOUT LOWER URINARY TRACT SYMPTOMS: ICD-10-CM

## 2025-06-17 LAB
ANION GAP SERPL CALCULATED.3IONS-SCNC: 13 MMOL/L (ref 7–15)
BUN SERPL-MCNC: 17.5 MG/DL (ref 8–23)
CALCIUM SERPL-MCNC: 9.2 MG/DL (ref 8.8–10.4)
CHLORIDE SERPL-SCNC: 106 MMOL/L (ref 98–107)
CREAT SERPL-MCNC: 1.39 MG/DL (ref 0.67–1.17)
EGFRCR SERPLBLD CKD-EPI 2021: 50 ML/MIN/1.73M2
ERYTHROCYTE [DISTWIDTH] IN BLOOD BY AUTOMATED COUNT: 13.3 % (ref 10–15)
GLUCOSE SERPL-MCNC: 82 MG/DL (ref 70–99)
HCO3 SERPL-SCNC: 21 MMOL/L (ref 22–29)
HCT VFR BLD AUTO: 46.6 % (ref 40–53)
HGB BLD-MCNC: 14.9 G/DL (ref 13.3–17.7)
MCH RBC QN AUTO: 29.4 PG (ref 26.5–33)
MCHC RBC AUTO-ENTMCNC: 32 G/DL (ref 31.5–36.5)
MCV RBC AUTO: 92 FL (ref 78–100)
PLATELET # BLD AUTO: 167 10E3/UL (ref 150–450)
POTASSIUM SERPL-SCNC: 4.2 MMOL/L (ref 3.4–5.3)
RBC # BLD AUTO: 5.07 10E6/UL (ref 4.4–5.9)
SODIUM SERPL-SCNC: 140 MMOL/L (ref 135–145)
WBC # BLD AUTO: 7.7 10E3/UL (ref 4–11)

## 2025-06-17 PROCEDURE — 36415 COLL VENOUS BLD VENIPUNCTURE: CPT | Mod: ORL | Performed by: FAMILY MEDICINE

## 2025-06-17 PROCEDURE — 85027 COMPLETE CBC AUTOMATED: CPT | Mod: ORL | Performed by: FAMILY MEDICINE

## 2025-06-17 PROCEDURE — P9604 ONE-WAY ALLOW PRORATED TRIP: HCPCS | Mod: ORL | Performed by: FAMILY MEDICINE

## 2025-06-17 PROCEDURE — 80048 BASIC METABOLIC PNL TOTAL CA: CPT | Mod: ORL | Performed by: FAMILY MEDICINE
